# Patient Record
Sex: FEMALE | Race: WHITE | Employment: STUDENT | URBAN - METROPOLITAN AREA
[De-identification: names, ages, dates, MRNs, and addresses within clinical notes are randomized per-mention and may not be internally consistent; named-entity substitution may affect disease eponyms.]

---
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
## 2023-05-01 ENCOUNTER — HOSPITAL ENCOUNTER (EMERGENCY)
Age: 18
Discharge: HOME | End: 2023-05-01
Attending: EMERGENCY MEDICINE
Payer: COMMERCIAL

## 2023-05-01 ENCOUNTER — APPOINTMENT (OUTPATIENT)
Dept: GENERAL RADIOLOGY | Age: 18
End: 2023-05-01
Payer: COMMERCIAL

## 2023-05-01 VITALS
WEIGHT: 92.15 LBS | DIASTOLIC BLOOD PRESSURE: 79 MMHG | SYSTOLIC BLOOD PRESSURE: 127 MMHG | BODY MASS INDEX: 16.96 KG/M2 | OXYGEN SATURATION: 100 % | HEART RATE: 80 BPM | RESPIRATION RATE: 16 BRPM | HEIGHT: 62 IN | TEMPERATURE: 97.6 F

## 2023-05-01 DIAGNOSIS — M25.532 LEFT WRIST PAIN: Primary | ICD-10-CM

## 2023-05-01 PROCEDURE — 99283 EMERGENCY DEPT VISIT LOW MDM: CPT

## 2023-05-01 PROCEDURE — 73110 X-RAY EXAM OF WRIST: CPT

## 2023-05-01 RX ORDER — IBUPROFEN 400 MG/1
400 TABLET ORAL EVERY 8 HOURS PRN
Qty: 21 TABLET | Refills: 0 | Status: SHIPPED | OUTPATIENT
Start: 2023-05-01 | End: 2023-05-08

## 2023-05-01 ASSESSMENT — PAIN SCALES - GENERAL
PAINLEVEL_OUTOF10: 5
PAINLEVEL_OUTOF10: 3

## 2023-05-01 ASSESSMENT — PAIN DESCRIPTION - DESCRIPTORS: DESCRIPTORS: ACHING

## 2023-05-01 ASSESSMENT — PAIN DESCRIPTION - ORIENTATION: ORIENTATION: LEFT

## 2023-05-01 ASSESSMENT — PAIN DESCRIPTION - LOCATION: LOCATION: WRIST

## 2023-05-01 ASSESSMENT — PAIN - FUNCTIONAL ASSESSMENT: PAIN_FUNCTIONAL_ASSESSMENT: 0-10

## 2023-05-02 NOTE — ED PROVIDER NOTES
Hives    Cefdinir Hives    Cephalosporins Hives    Penicillins Hives       REVIEW OF SYSTEMS  Positives and pertinent negatives as per HPI. Six other systems were reviewed and are negative. Nursing notes pertaining to ROS were reviewed. PHYSICAL EXAM   /79   Pulse 80   Temp 97.6 °F (36.4 °C) (Oral)   Resp 16   Ht 5' 2\" (1.575 m)   Wt (!) 92 lb 2.4 oz (41.8 kg)   LMP 04/01/2023   SpO2 100%   BMI 16.85 kg/m²   GENERAL APPEARANCE: Awake and alert. Cooperative. No acute distress. HEAD: Normocephalic. Atraumatic. EYES: PERRL. EOM's grossly intact. No scleral icterus, injection or exudate  ENT: Mucous membranes are moist.  EXTREMITIES: Patient has no tenderness to palpation of the left elbow or forearm. Patient does have tenderness to palpation over the right radial styloid as well as the scaphoid and mild tenderness axial compression of the left thumb. There is no tenderness palpation over the ulna or ulnar styloid or the remainder of the carpals. Patient has normal  strength of the left hand with normal flexor and extensor tendon strength of all digits of the left hand with normal sensation to light touch of the left hand  SKIN: Warm and dry. NEUROLOGICAL: Alert and oriented. RADIOLOGY    XR WRIST LEFT (MIN 3 VIEWS)   Final Result   No acute osseous abnormality of the left wrist.           ED COURSE/MDM  Wrist pain: Differential diagnosis includes but for sprain as radiographs reveal no acute fracture or dislocation but patient does have tenderness over the snuffbox patient will be placed in a thumb spica splint for the next 10 to 14 days with instructions to follow-up with orthopedics at the end of that time. To reexamine the wrist and consider repeat x-rays if the symptoms persist to rule out occult navicular injury. RICE, ibuprofen as needed. Patient was given scripts for the following medications. I counseled patient how to take these medications.    New Prescriptions
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
## 2024-08-28 NOTE — LETTER
[facility] Discharge Plan  Informed patient is ready for discharge. Patient to be picked up by [facility] at 1500. Patient/interested person has been counseled for post hospitalization care.  Patient agrees and understands goals and plan. Initial implementation of the patient’s discharge plan has been arranged, including any devices/equipment needed for discharge. Discharge plan communicated to MD, RN, FREDDY, CLINT[facility], SW, and Receiving Facility/Agency.    Patient’s discharge destination is Home, family declined home services. Although clinically accepted at [facility], insurance denied. Family not interested in appeal when SW discussed with them.      SW to close out consult upon patient's hospital discharge.   [facility] [address] [address] [place] [address]  Phone: [phone]  Fax: [phone]    Mikal Oden MD        September 27, 2018     Patient: Trinidad Valerio   YOB: 2005   Date of Visit: 9/27/2018       To Whom it May Concern:    Trinidad Valerio was seen in my clinic on 9/27/2018. She may return to school on 9/27/18. If you have any questions or concerns, please don't hesitate to call.     Sincerely,            MD Mikal Luevano MD